# Patient Record
Sex: MALE | Race: WHITE | RURAL
[De-identification: names, ages, dates, MRNs, and addresses within clinical notes are randomized per-mention and may not be internally consistent; named-entity substitution may affect disease eponyms.]

---

## 2024-09-20 ENCOUNTER — TRANSCRIBE ORDERS (OUTPATIENT)
Facility: HOSPITAL | Age: 39
End: 2024-09-20

## 2024-09-20 DIAGNOSIS — R06.09 OTHER FORMS OF DYSPNEA: Primary | ICD-10-CM

## 2024-09-24 ENCOUNTER — TELEPHONE (OUTPATIENT)
Facility: HOSPITAL | Age: 39
End: 2024-09-24

## 2025-04-21 ENCOUNTER — HOSPITAL ENCOUNTER (EMERGENCY)
Facility: HOSPITAL | Age: 40
Discharge: HOME | End: 2025-04-21
Attending: EMERGENCY MEDICINE
Payer: COMMERCIAL

## 2025-04-21 VITALS
HEART RATE: 84 BPM | TEMPERATURE: 98.2 F | HEIGHT: 73 IN | OXYGEN SATURATION: 97 % | DIASTOLIC BLOOD PRESSURE: 102 MMHG | BODY MASS INDEX: 36.45 KG/M2 | SYSTOLIC BLOOD PRESSURE: 161 MMHG | RESPIRATION RATE: 17 BRPM | WEIGHT: 275 LBS

## 2025-04-21 DIAGNOSIS — S05.02XA ABRASION OF LEFT CORNEA, INITIAL ENCOUNTER: ICD-10-CM

## 2025-04-21 DIAGNOSIS — T15.92XA FOREIGN BODY OF LEFT EXTERNAL EYE, INITIAL ENCOUNTER: Primary | ICD-10-CM

## 2025-04-21 PROCEDURE — 99283 EMERGENCY DEPT VISIT LOW MDM: CPT

## 2025-04-21 RX ORDER — OFLOXACIN 3 MG/ML
2 SOLUTION/ DROPS OPHTHALMIC 4 TIMES DAILY
Qty: 5 ML | Refills: 0 | Status: SHIPPED | OUTPATIENT
Start: 2025-04-21 | End: 2025-05-01

## 2025-04-21 ASSESSMENT — PAIN - FUNCTIONAL ASSESSMENT
PAIN_FUNCTIONAL_ASSESSMENT: NONE - DENIES PAIN
PAIN_FUNCTIONAL_ASSESSMENT: NONE - DENIES PAIN

## 2025-04-21 ASSESSMENT — LIFESTYLE VARIABLES
HOW MANY STANDARD DRINKS CONTAINING ALCOHOL DO YOU HAVE ON A TYPICAL DAY: PATIENT DOES NOT DRINK
HOW OFTEN DO YOU HAVE A DRINK CONTAINING ALCOHOL: NEVER

## 2025-04-21 NOTE — ED PROVIDER NOTES
[facility] EMERGENCY DEPARTMENT  EMERGENCY DEPARTMENT ENCOUNTER       Pt Name: Yong Edmondson  MRN: [id number]  Birthdate 1985  Date of evaluation: 4/21/2025  Provider: Mell Valenzuela MD   PCP: Joanna Bull APRN - NP  Note Started: 9:18 AM EDT 4/21/25     CHIEF COMPLAINT       Chief Complaint   Patient presents with    Foreign Body in Eye        HISTORY OF PRESENT ILLNESS: 1 or more elements      History From: Patient, History limited by: none     Yong Edmondson is a 39 y.o. male presents to ED complaining of left eye injury.  Patient reports injury occurred 3 days ago.  Reports he was working when he felt like something got into his left eye.  Still feels like he has a foreign body sensation in left eye.       Please See MDM for Additional Details of the HPI/PMH  Nursing Notes were all reviewed and agreed with or any disagreements were addressed in the HPI.     REVIEW OF SYSTEMS        Positives and Pertinent negatives as per HPI.    PAST HISTORY     Past Medical History:  History reviewed. No pertinent past medical history.    Past Surgical History:  History reviewed. No pertinent surgical history.    Family History:  Family History   Problem Relation Age of Onset    Hypertension Father     Stroke Maternal Grandfather     Diabetes Father        Social History:  Social History     Tobacco Use    Smoking status: Never   Substance Use Topics    Alcohol use: No       CURRENT MEDICATIONS      Previous Medications    AZITHROMYCIN (ZITHROMAX) 250 MG TABLET    Take 2 tablets on day 1 then 1 tablet for next four days    ONDANSETRON (ZOFRAN-ODT) 4 MG DISINTEGRATING TABLET    Take 4 mg by mouth every 8 hours as needed    PHENTERMINE 30 MG CAPSULE    Take 30 mg by mouth.       SCREENINGS               No data recorded            PHYSICAL EXAM      ED Triage Vitals [04/21/25 0851]   Encounter Vitals Group      BP (!) 161/102      Systolic BP Percentile       Diastolic BP Percentile       Pulse 84

## 2025-04-21 NOTE — DISCHARGE INSTRUCTIONS
You had a small foreign body removed from your left eye.  You also have a scratch on your cornea or the surface of your eye, this often feels like something is still stuck in your eye.  Please use the antibiotic drops and avoid bright lights in your eye.  You can take Tylenol or ibuprofen for pain.  Follow-up with an ophthalmologist as soon as possible for an in-depth eye exam.